# Patient Record
Sex: FEMALE | Race: WHITE | NOT HISPANIC OR LATINO | Employment: OTHER | URBAN - METROPOLITAN AREA
[De-identification: names, ages, dates, MRNs, and addresses within clinical notes are randomized per-mention and may not be internally consistent; named-entity substitution may affect disease eponyms.]

---

## 2017-04-25 PROBLEM — M85.80 OSTEOPENIA: Status: ACTIVE | Noted: 2017-04-25

## 2020-07-02 ENCOUNTER — HOSPITAL ENCOUNTER (OUTPATIENT)
Facility: HOSPITAL | Age: 85
Discharge: HOME | End: 2020-07-03
Attending: EMERGENCY MEDICINE
Payer: MEDICARE

## 2020-07-02 DIAGNOSIS — R41.3 MEMORY DEFICIT: ICD-10-CM

## 2020-07-02 DIAGNOSIS — E11.65 TYPE 2 DIABETES MELLITUS WITH HYPERGLYCEMIA, WITHOUT LONG-TERM CURRENT USE OF INSULIN: Chronic | ICD-10-CM

## 2020-07-02 DIAGNOSIS — F02.80 ALZHEIMER'S DEMENTIA WITHOUT BEHAVIORAL DISTURBANCE, UNSPECIFIED TIMING OF DEMENTIA ONSET: Primary | Chronic | ICD-10-CM

## 2020-07-02 DIAGNOSIS — G30.9 ALZHEIMER'S DEMENTIA WITHOUT BEHAVIORAL DISTURBANCE, UNSPECIFIED TIMING OF DEMENTIA ONSET: Primary | Chronic | ICD-10-CM

## 2020-07-02 PROBLEM — F03.90 DEMENTIA: Chronic | Status: ACTIVE | Noted: 2020-07-02

## 2020-07-02 PROBLEM — E03.9 HYPOTHYROIDISM: Chronic | Status: ACTIVE | Noted: 2020-07-02

## 2020-07-02 PROBLEM — I10 ESSENTIAL HYPERTENSION: Chronic | Status: ACTIVE | Noted: 2020-07-02

## 2020-07-02 LAB
ALBUMIN SERPL BCP-MCNC: 3.8 G/DL (ref 3.5–5.2)
ALP SERPL-CCNC: 90 U/L (ref 55–135)
ALT SERPL W/O P-5'-P-CCNC: 12 U/L (ref 10–44)
ANION GAP SERPL CALC-SCNC: 12 MMOL/L (ref 8–16)
AST SERPL-CCNC: 18 U/L (ref 10–40)
BACTERIA #/AREA URNS HPF: NORMAL /HPF
BASOPHILS # BLD AUTO: 0.03 K/UL (ref 0–0.2)
BASOPHILS NFR BLD: 0.4 % (ref 0–1.9)
BILIRUB SERPL-MCNC: 1.2 MG/DL (ref 0.1–1)
BILIRUB UR QL STRIP: NEGATIVE
BUN SERPL-MCNC: 11 MG/DL (ref 8–23)
CALCIUM SERPL-MCNC: 9.1 MG/DL (ref 8.7–10.5)
CHLORIDE SERPL-SCNC: 100 MMOL/L (ref 95–110)
CLARITY UR: ABNORMAL
CO2 SERPL-SCNC: 21 MMOL/L (ref 23–29)
COLOR UR: ABNORMAL
CREAT SERPL-MCNC: 1.1 MG/DL (ref 0.5–1.4)
DIFFERENTIAL METHOD: NORMAL
EOSINOPHIL # BLD AUTO: 0.1 K/UL (ref 0–0.5)
EOSINOPHIL NFR BLD: 1.7 % (ref 0–8)
ERYTHROCYTE [DISTWIDTH] IN BLOOD BY AUTOMATED COUNT: 12.3 % (ref 11.5–14.5)
EST. GFR  (AFRICAN AMERICAN): 53 ML/MIN/1.73 M^2
EST. GFR  (NON AFRICAN AMERICAN): 46 ML/MIN/1.73 M^2
GLUCOSE SERPL-MCNC: 373 MG/DL (ref 70–110)
GLUCOSE UR QL STRIP: ABNORMAL
HCT VFR BLD AUTO: 42.3 % (ref 37–48.5)
HGB BLD-MCNC: 14.3 G/DL (ref 12–16)
HGB UR QL STRIP: ABNORMAL
IMM GRANULOCYTES # BLD AUTO: 0.03 K/UL (ref 0–0.04)
IMM GRANULOCYTES NFR BLD AUTO: 0.4 % (ref 0–0.5)
KETONES UR QL STRIP: ABNORMAL
LEUKOCYTE ESTERASE UR QL STRIP: ABNORMAL
LIPASE SERPL-CCNC: 10 U/L (ref 4–60)
LYMPHOCYTES # BLD AUTO: 1.5 K/UL (ref 1–4.8)
LYMPHOCYTES NFR BLD: 21.8 % (ref 18–48)
MCH RBC QN AUTO: 30.7 PG (ref 27–31)
MCHC RBC AUTO-ENTMCNC: 33.8 G/DL (ref 32–36)
MCV RBC AUTO: 91 FL (ref 82–98)
MICROSCOPIC COMMENT: NORMAL
MONOCYTES # BLD AUTO: 0.4 K/UL (ref 0.3–1)
MONOCYTES NFR BLD: 6.1 % (ref 4–15)
NEUTROPHILS # BLD AUTO: 4.9 K/UL (ref 1.8–7.7)
NEUTROPHILS NFR BLD: 69.6 % (ref 38–73)
NITRITE UR QL STRIP: NEGATIVE
NRBC BLD-RTO: 0 /100 WBC
PH UR STRIP: 6 [PH] (ref 5–8)
PLATELET # BLD AUTO: 208 K/UL (ref 150–350)
PMV BLD AUTO: 11.1 FL (ref 9.2–12.9)
POCT GLUCOSE: 263 MG/DL (ref 70–110)
POCT GLUCOSE: 285 MG/DL (ref 70–110)
POTASSIUM SERPL-SCNC: 4 MMOL/L (ref 3.5–5.1)
PROT SERPL-MCNC: 6.9 G/DL (ref 6–8.4)
PROT UR QL STRIP: NEGATIVE
RBC # BLD AUTO: 4.66 M/UL (ref 4–5.4)
RBC #/AREA URNS HPF: 2 /HPF (ref 0–4)
SARS-COV-2 RDRP RESP QL NAA+PROBE: NEGATIVE
SODIUM SERPL-SCNC: 133 MMOL/L (ref 136–145)
SP GR UR STRIP: 1.01 (ref 1–1.03)
SQUAMOUS #/AREA URNS HPF: 5 /HPF
URN SPEC COLLECT METH UR: ABNORMAL
UROBILINOGEN UR STRIP-ACNC: NEGATIVE EU/DL
WBC # BLD AUTO: 7.01 K/UL (ref 3.9–12.7)
WBC #/AREA URNS HPF: 2 /HPF (ref 0–5)
YEAST URNS QL MICRO: NORMAL

## 2020-07-02 PROCEDURE — 82607 VITAMIN B-12: CPT

## 2020-07-02 PROCEDURE — 81000 URINALYSIS NONAUTO W/SCOPE: CPT

## 2020-07-02 PROCEDURE — 85025 COMPLETE CBC W/AUTO DIFF WBC: CPT

## 2020-07-02 PROCEDURE — 96361 HYDRATE IV INFUSION ADD-ON: CPT

## 2020-07-02 PROCEDURE — 82962 GLUCOSE BLOOD TEST: CPT

## 2020-07-02 PROCEDURE — 25000003 PHARM REV CODE 250: Performed by: EMERGENCY MEDICINE

## 2020-07-02 PROCEDURE — 96374 THER/PROPH/DIAG INJ IV PUSH: CPT

## 2020-07-02 PROCEDURE — 99285 EMERGENCY DEPT VISIT HI MDM: CPT | Mod: 25

## 2020-07-02 PROCEDURE — 63600175 PHARM REV CODE 636 W HCPCS: Performed by: HOSPITALIST

## 2020-07-02 PROCEDURE — G0378 HOSPITAL OBSERVATION PER HR: HCPCS

## 2020-07-02 PROCEDURE — 25000003 PHARM REV CODE 250: Performed by: HOSPITALIST

## 2020-07-02 PROCEDURE — 83690 ASSAY OF LIPASE: CPT

## 2020-07-02 PROCEDURE — S0028 INJECTION, FAMOTIDINE, 20 MG: HCPCS | Performed by: EMERGENCY MEDICINE

## 2020-07-02 PROCEDURE — 80053 COMPREHEN METABOLIC PANEL: CPT

## 2020-07-02 PROCEDURE — 96372 THER/PROPH/DIAG INJ SC/IM: CPT | Mod: 59

## 2020-07-02 PROCEDURE — U0002 COVID-19 LAB TEST NON-CDC: HCPCS

## 2020-07-02 RX ORDER — INSULIN ASPART 100 [IU]/ML
1-10 INJECTION, SOLUTION INTRAVENOUS; SUBCUTANEOUS
Status: DISCONTINUED | OUTPATIENT
Start: 2020-07-02 | End: 2020-07-03 | Stop reason: HOSPADM

## 2020-07-02 RX ORDER — LEVOTHYROXINE SODIUM 75 UG/1
75 TABLET ORAL
Status: DISCONTINUED | OUTPATIENT
Start: 2020-07-03 | End: 2020-07-03 | Stop reason: HOSPADM

## 2020-07-02 RX ORDER — GLUCAGON 1 MG
1 KIT INJECTION
Status: DISCONTINUED | OUTPATIENT
Start: 2020-07-02 | End: 2020-07-03 | Stop reason: HOSPADM

## 2020-07-02 RX ORDER — SODIUM CHLORIDE 0.9 % (FLUSH) 0.9 %
10 SYRINGE (ML) INJECTION
Status: DISCONTINUED | OUTPATIENT
Start: 2020-07-02 | End: 2020-07-03 | Stop reason: HOSPADM

## 2020-07-02 RX ORDER — FAMOTIDINE 10 MG/ML
20 INJECTION INTRAVENOUS
Status: COMPLETED | OUTPATIENT
Start: 2020-07-02 | End: 2020-07-02

## 2020-07-02 RX ORDER — IBUPROFEN 200 MG
24 TABLET ORAL
Status: DISCONTINUED | OUTPATIENT
Start: 2020-07-02 | End: 2020-07-03 | Stop reason: HOSPADM

## 2020-07-02 RX ORDER — ACETAMINOPHEN 325 MG/1
650 TABLET ORAL EVERY 6 HOURS PRN
Status: DISCONTINUED | OUTPATIENT
Start: 2020-07-02 | End: 2020-07-03 | Stop reason: HOSPADM

## 2020-07-02 RX ORDER — DONEPEZIL HYDROCHLORIDE 5 MG/1
5 TABLET, FILM COATED ORAL NIGHTLY
Status: DISCONTINUED | OUTPATIENT
Start: 2020-07-02 | End: 2020-07-03 | Stop reason: HOSPADM

## 2020-07-02 RX ORDER — AMLODIPINE BESYLATE 5 MG/1
5 TABLET ORAL DAILY
Status: DISCONTINUED | OUTPATIENT
Start: 2020-07-03 | End: 2020-07-03 | Stop reason: HOSPADM

## 2020-07-02 RX ORDER — MAG HYDROX/ALUMINUM HYD/SIMETH 200-200-20
60 SUSPENSION, ORAL (FINAL DOSE FORM) ORAL
Status: COMPLETED | OUTPATIENT
Start: 2020-07-02 | End: 2020-07-02

## 2020-07-02 RX ORDER — TALC
6 POWDER (GRAM) TOPICAL NIGHTLY PRN
Status: DISCONTINUED | OUTPATIENT
Start: 2020-07-02 | End: 2020-07-03 | Stop reason: HOSPADM

## 2020-07-02 RX ORDER — IBUPROFEN 200 MG
16 TABLET ORAL
Status: DISCONTINUED | OUTPATIENT
Start: 2020-07-02 | End: 2020-07-03 | Stop reason: HOSPADM

## 2020-07-02 RX ORDER — ENOXAPARIN SODIUM 100 MG/ML
40 INJECTION SUBCUTANEOUS EVERY 24 HOURS
Status: DISCONTINUED | OUTPATIENT
Start: 2020-07-02 | End: 2020-07-03 | Stop reason: HOSPADM

## 2020-07-02 RX ADMIN — ENOXAPARIN SODIUM 40 MG: 40 INJECTION SUBCUTANEOUS at 09:07

## 2020-07-02 RX ADMIN — SODIUM CHLORIDE 1000 ML: 0.9 INJECTION, SOLUTION INTRAVENOUS at 04:07

## 2020-07-02 RX ADMIN — DONEPEZIL HYDROCHLORIDE 5 MG: 5 TABLET, FILM COATED ORAL at 09:07

## 2020-07-02 RX ADMIN — FAMOTIDINE 20 MG: 10 INJECTION, SOLUTION INTRAVENOUS at 04:07

## 2020-07-02 RX ADMIN — ALUMINUM HYDROXIDE, MAGNESIUM HYDROXIDE, AND SIMETHICONE 60 ML: 200; 200; 20 SUSPENSION ORAL at 04:07

## 2020-07-02 RX ADMIN — Medication 6 MG: at 09:07

## 2020-07-02 NOTE — ED NOTES
"Past Medical History:   Diagnosis Date    Arthritis     Cataract     Diabetes mellitus     Hyperlipidemia     Hypertension     Skin cancer of nose     Thyroid condition      Pt presenting with report from  that pt is has been having confusing eg. " he is my friend" . She is aox3 unable to tell me what today is. Pt placed on cardiac monitor, cont PO, states that she is her for here for her nerves and all. Medications discussed. Pt denies nvfd, sob. When asked if she has chest pain pt states she feel shakey up there Mid upper chest   "

## 2020-07-02 NOTE — PROVIDER PROGRESS NOTES - EMERGENCY DEPT.
Emergency Department TeleTRIAGE Encounter Note      CHIEF COMPLAINT    Chief Complaint   Patient presents with    Memory Loss     Per patient and spouse pt has had some memory loss x 2 days. Denies any other symptoms. Spouse states pt could not remember who he was 2 days ago. pt Alert and appropriate at present. Denies dysuria, CP or SOB. Pt does report frontal HA x 2 days. VSS.        VITAL SIGNS   Initial Vitals [07/02/20 1307]   BP Pulse Resp Temp SpO2   (!) 151/96 88 18 98.2 °F (36.8 °C) 97 %      MAP       --            ALLERGIES    Review of patient's allergies indicates:   Allergen Reactions    Iodinated contrast media     Nexium [esomeprazole magnesium]     Other omega-3s      Cephlosporin, contrast dye      Penicillins     Plaquenil [hydroxychloroquine]     Prevacid [lansoprazole]     Prilosec [omeprazole magnesium]        PROVIDER TRIAGE NOTE  Sort note: Alice Medina, 85 y.o.  presented to the ED with c/o general malaise, shaking inside for the past few days.  Pt states that she took some of her husbands medications.  states this is not true.  Pt has had memory loss for the past 2 days.     Patient seen and medically screened by the Nurse Practitioner in Triage due to ED crowding.  Appropriate tests and/or medications ordered.  I performed a limited physical exam.  I am not assuming care of this patient at this time 1:11 PM.      ORDERS  Labs Reviewed   URINALYSIS, REFLEX TO URINE CULTURE   CBC W/ AUTO DIFFERENTIAL   COMPREHENSIVE METABOLIC PANEL       ED Orders (720h ago, onward)    Start Ordered     Status Ordering Provider    07/02/20 1318 07/02/20 1318  Urinalysis, Reflex to Urine Culture Urine, Clean Catch  STAT      Ordered TASHA SCHRADER    07/02/20 1318 07/02/20 1318  CBC auto differential  STAT  Collect    Ordered TASHA SCHRADER    07/02/20 1318 07/02/20 1318  Comprehensive metabolic panel  STAT  Collect    Ordered TASHA SCHRADER            Virtual Visit Note: The  provider triage portion of this emergency department evaluation and documentation was performed via [employer], a HIPAA-compliant telemedicine application, in concert with a tele-presenter in the room. A face to face patient evaluation with one of my colleagues will occur once the patient is placed in an emergency department room.      DISCLAIMER: This note was prepared with [employer] voice recognition transcription software. Garbled syntax, mangled pronouns, and other bizarre constructions may be attributed to that software system.

## 2020-07-02 NOTE — ED PROVIDER NOTES
Encounter Date: 7/2/2020       History     Chief Complaint   Patient presents with    Memory Loss     Per patient and spouse pt has had some memory loss x 2 days. Denies any other symptoms. Spouse states pt could not remember who he was 2 days ago. pt Alert and appropriate at present. Denies dysuria, CP or SOB. Pt does report frontal HA x 2 days. VSS.      HPI   This 85-year-old female presents to the emergency room, brought in by her  who reports that for 3 days the patient has had significant memory loss.  She does not recognize him.  She argues with him about directions to go home when she is driving in the car.  The patient denies painful urination there is no known history of fever.  The patient does not know the day of the week, or the month.  She complains of generally feeling bad .  She complains of epigastric abdominal pain present for 2 months off and on.  She denies other injuries or problems.  Review of patient's allergies indicates:   Allergen Reactions    Iodinated contrast media     Nexium [esomeprazole magnesium]     Other omega-3s      Cephlosporin, contrast dye      Penicillins     Plaquenil [hydroxychloroquine]     Prevacid [lansoprazole]     Prilosec [omeprazole magnesium]      Past Medical History:   Diagnosis Date    Arthritis     Cataract     Diabetes mellitus     Hyperlipidemia     Hypertension     Skin cancer of nose     Thyroid condition      Past Surgical History:   Procedure Laterality Date    SKIN CANCER EXCISION       Family History   Problem Relation Age of Onset    Thyroid disease Mother     Hypertension Sister     Diabetes Sister     Diabetes Brother     Thyroid disease Son     Amblyopia Neg Hx     Blindness Neg Hx     Cancer Neg Hx     Cataracts Neg Hx     Glaucoma Neg Hx     Macular degeneration Neg Hx     Retinal detachment Neg Hx     Strabismus Neg Hx     Stroke Neg Hx      Social History     Tobacco Use    Smoking status: Former Smoker    Substance Use Topics    Alcohol use: Yes    Drug use: No     Review of Systems  The patient was questioned specifically with regard to the following.  General: Fever, chills, sweats. Neuro: Headache. Eyes: eye problems. ENT: Ear pain, sore throat. Cardiovascular: Chest pain. Respiratory: Cough, shortness of breath. Gastrointestinal: Abdominal pain, vomiting, diarrhea. Genitourinary: Painful urination.  Musculoskeletal: Arm and leg problems. Skin: Rash.  The review of systems was negative except for the following:  Memory loss, feeling bad, epigastric abdominal pain.  Physical Exam     Initial Vitals [07/02/20 1307]   BP Pulse Resp Temp SpO2   (!) 151/96 88 18 98.2 °F (36.8 °C) 97 %      MAP       --         Physical Exam  The patient was examined specifically for the following:   General:No significant distress, Good color, Warm and dry. Head and neck:Scalp atraumatic, Neck supple. Neurological:Appropriate conversation, Gross motor deficits. Eyes:Conjugate gaze, Clear corneas. ENT: No epistaxis. Cardiac: Regular rate and rhythm, Grossly normal heart tones. Pulmonary: Wheezing, Rales. Gastrointestinal: Abdominal tenderness, Abdominal distention. Musculoskeletal: Extremity deformity, Apparent pain with range of motion of the joints. Skin: Rash.   The findings on examination were normal except for the following:  Cranial nerves, motor and sensory examination grossly unremarkable.  The patient has no recollection of the day of the week or the month.  She responds are normally in conversation.  ED Course   Procedures  Labs Reviewed   COMPREHENSIVE METABOLIC PANEL - Abnormal; Notable for the following components:       Result Value    Sodium 133 (*)     CO2 21 (*)     Glucose 373 (*)     Total Bilirubin 1.2 (*)     eGFR if  53 (*)     eGFR if non  46 (*)     All other components within normal limits   URINALYSIS, REFLEX TO URINE CULTURE - Abnormal; Notable for the following components:     Appearance, UA Hazy (*)     Glucose, UA 3+ (*)     Ketones, UA 1+ (*)     Occult Blood UA 1+ (*)     Leukocytes, UA 1+ (*)     All other components within normal limits    Narrative:     You may obtain urine by catheterization or clean catch.  Specimen Source->Urine   POCT GLUCOSE - Abnormal; Notable for the following components:    POCT Glucose 285 (*)     All other components within normal limits   POCT GLUCOSE - Abnormal; Notable for the following components:    POCT Glucose 263 (*)     All other components within normal limits   CBC W/ AUTO DIFFERENTIAL   LIPASE   SARS-COV-2 RNA AMPLIFICATION, QUAL   URINALYSIS MICROSCOPIC    Narrative:     You may obtain urine by catheterization or clean catch.  Specimen Source->Urine          Imaging Results          MRI Brain Without Contrast (Final result)  Result time 07/02/20 20:34:17    Final result by Hao Al MD (07/02/20 20:34:17)                 Impression:      No acute intracranial abnormality.    Senescent changes and chronic microvascular ischemic change.      Electronically signed by: Hao Al MD  Date:    07/02/2020  Time:    20:34             Narrative:    EXAMINATION:  MRI BRAIN WITHOUT CONTRAST    CLINICAL HISTORY:  Altered mental status;.    TECHNIQUE:  Multiplanar multisequence MR imaging of the brain was performed without contrast.    COMPARISON:  Head CT earlier same day    FINDINGS:  Intracranial compartment: Brain appears normally formed.  Frontal predominant involutional changes again noted.  The ventricles are midline and stable in size and configuration without distortion by mass effect or acute hydrocephalus.    No extra-axial blood or fluid collections.    Mild degree of patchy T2/FLAIR hyperintense signal foci of the periventricular and subcortical white matter, likely sequela of chronic small vessel ischemic change.  No mass lesion, acute hemorrhage, edema or acute infarct.    Normal vascular flow voids are preserved.  Partial empty  sella configuration, nonspecific.  Parasellar regions are within normal limits.    Skull/extracranial contents (limited evaluation): Bone marrow signal intensity is normal.  Suspected prior surgery to the left ocular lens.                               CT Head Without Contrast (Final result)  Result time 07/02/20 15:50:04    Final result by Sky Velasquez MD (07/02/20 15:50:04)                 Impression:      No acute abnormality.  Involutional changes with sequela of chronic microvascular ischemia.      Electronically signed by: Sky Velasquez MD  Date:    07/02/2020  Time:    15:50             Narrative:    EXAMINATION:  CT HEAD WITHOUT CONTRAST    CLINICAL HISTORY:  Altered mental status; Other amnesia    TECHNIQUE:  Low dose axial CT images obtained throughout the head without intravenous contrast. Sagittal and coronal reconstructions were performed.    COMPARISON:  None.    FINDINGS:  Intracranial compartment:    Involutional changes are noted.  No hydrocephalus.  No extra-axial blood or fluid collections.    Periventricular white matter hypoattenuation in keeping with chronic microvascular disease.  No parenchymal mass, hemorrhage, edema or major vascular distribution infarct.    Skull/extracranial contents (limited evaluation): No fracture. Mastoid air cells and paranasal sinuses are essentially clear.                                Medical decision making:  Given the above this patient presents to the emergency room with forgetful behavior over the course of the last 3 days only.  This is fairly sudden onset.  Dementia is considered but given the sudden onset delirium is considered.  Neurology was consulted.  Inpatient evaluation was recommended.  I will admit the patient for further evaluation and treatment CT of the head laboratory evaluation is essentially unremarkable in the emergency room.  The patient does have some hyperglycemia                                       Clinical Impression:        ICD-10-CM ICD-9-CM   1. Alzheimer's dementia without behavioral disturbance, unspecified timing of dementia onset  G30.9 331.0    F02.80 294.10   2. Memory deficit  R41.3 780.93   3. Type 2 diabetes mellitus with hyperglycemia, without long-term current use of insulin  E11.65 250.00     790.29             ED Disposition Condition    Observation                           Storm Denny MD  07/03/20 1927

## 2020-07-03 VITALS
BODY MASS INDEX: 26.75 KG/M2 | HEIGHT: 63 IN | HEART RATE: 73 BPM | OXYGEN SATURATION: 94 % | WEIGHT: 151 LBS | RESPIRATION RATE: 16 BRPM | DIASTOLIC BLOOD PRESSURE: 73 MMHG | TEMPERATURE: 98 F | SYSTOLIC BLOOD PRESSURE: 134 MMHG

## 2020-07-03 LAB
FOLATE SERPL-MCNC: 9 NG/ML (ref 4–24)
POCT GLUCOSE: 275 MG/DL (ref 70–110)
POCT GLUCOSE: 319 MG/DL (ref 70–110)
POCT GLUCOSE: 349 MG/DL (ref 70–110)
PROCALCITONIN SERPL IA-MCNC: 0.02 NG/ML
T4 FREE SERPL-MCNC: 1.07 NG/DL (ref 0.71–1.51)
TSH SERPL DL<=0.005 MIU/L-ACNC: 12.63 UIU/ML (ref 0.4–4)
VIT B12 SERPL-MCNC: 424 PG/ML (ref 210–950)

## 2020-07-03 PROCEDURE — 84439 ASSAY OF FREE THYROXINE: CPT

## 2020-07-03 PROCEDURE — 86592 SYPHILIS TEST NON-TREP QUAL: CPT

## 2020-07-03 PROCEDURE — 84145 PROCALCITONIN (PCT): CPT

## 2020-07-03 PROCEDURE — 82746 ASSAY OF FOLIC ACID SERUM: CPT

## 2020-07-03 PROCEDURE — G0378 HOSPITAL OBSERVATION PER HR: HCPCS | Mod: CS

## 2020-07-03 PROCEDURE — 96372 THER/PROPH/DIAG INJ SC/IM: CPT

## 2020-07-03 PROCEDURE — 25000003 PHARM REV CODE 250: Performed by: EMERGENCY MEDICINE

## 2020-07-03 PROCEDURE — 36415 COLL VENOUS BLD VENIPUNCTURE: CPT

## 2020-07-03 PROCEDURE — 97535 SELF CARE MNGMENT TRAINING: CPT

## 2020-07-03 PROCEDURE — 63600175 PHARM REV CODE 636 W HCPCS: Performed by: EMERGENCY MEDICINE

## 2020-07-03 PROCEDURE — 84443 ASSAY THYROID STIM HORMONE: CPT

## 2020-07-03 PROCEDURE — 97165 OT EVAL LOW COMPLEX 30 MIN: CPT

## 2020-07-03 RX ORDER — DONEPEZIL HYDROCHLORIDE 5 MG/1
5 TABLET, FILM COATED ORAL NIGHTLY
Qty: 30 TABLET | Refills: 1 | Status: SHIPPED | OUTPATIENT
Start: 2020-07-03 | End: 2021-01-26 | Stop reason: SDUPTHER

## 2020-07-03 RX ORDER — LEVOTHYROXINE SODIUM 75 UG/1
100 TABLET ORAL
Qty: 45 TABLET | Refills: 1 | Status: ON HOLD | OUTPATIENT
Start: 2020-07-04 | End: 2021-02-27 | Stop reason: HOSPADM

## 2020-07-03 RX ADMIN — LEVOTHYROXINE SODIUM 75 MCG: 75 TABLET ORAL at 06:07

## 2020-07-03 RX ADMIN — INSULIN ASPART 3 UNITS: 100 INJECTION, SOLUTION INTRAVENOUS; SUBCUTANEOUS at 12:07

## 2020-07-03 RX ADMIN — AMLODIPINE BESYLATE 5 MG: 5 TABLET ORAL at 08:07

## 2020-07-03 NOTE — ASSESSMENT & PLAN NOTE
-Mrs. Medina was placed in observation status  -She noted diminished memory recently  -CT head and MRI obtained and no evidence of stroke  -Vitals essentially normal.  -Electrolytes and renal function are ok  -TSH elevated significantly.  Will increase levothyroxine to 100mcg daily.  -Will check folate, b12 and rpr in search for reversible encephalopathies.  -Clinically appears to be doing very well.  -Will send new Rx of donepizil as it is not on home med list  -Will place ambulatory referral to neurology and pt/ot for completion of OT-SHAMIKA: Occupational Therapy  Off-road Assessment Battery d/t pt still driving and may be unsafe.  -Discussed with patient and  and both understand.  -Follow up with pcp within 1 week.

## 2020-07-03 NOTE — PLAN OF CARE
Problem: Occupational Therapy Goal  Goal: Occupational Therapy Goal  Outcome: Adequate for Care Transition     Initial OT eval completed. Pt is functioning at baseline: supervision for ADLs/in-room functional mobility/transfers for safety. OT rec OP OT with completion of OT-SHAMIKA: Occupational Therapy  Off-road Assessment Battery d/t pt still driving and unsafe- forgetful with common ADLs/two step commands. No DME needs.

## 2020-07-03 NOTE — HPI
85 y.o. female with hypertension, hyperlipidemia, hypothyroidism, osteopenia, dm 2, and dementia (diagnosed November 2018) presents with her spouse complaint of worsening memory loss.  Denies fever, chills, cough, SOB, chest pain, palpitations, dizziness, syncope, headache, vision changes, weakness, facial asymmetry, nausea, vomiting, diarrhea, abdominal pain, bloody or black stools, or dysuria.  Per chart review she was diagnosed with dementia in 2018 and started on Aricept which she subsequently stopped taking because she felt like it was not working.  Was seen in the clinic on 06/30/2020 by Laura Hyde NP and given a refill for her Aricept and a referral to Neurology.  In the ED, CBC, CMP, urinalysis, and CT head were unremarkable for any acute abnormality.  COVID negative.  Placed in observation.

## 2020-07-03 NOTE — ASSESSMENT & PLAN NOTE
Check A1c  Hold oral antihyperglycemics while inpatient  PRN sliding scale insulin  ACHS glucose monitoring   ADA diet

## 2020-07-03 NOTE — PLAN OF CARE
Problem: Adult Inpatient Plan of Care  Goal: Plan of Care Review  Outcome: Ongoing, Progressing     Problem: Fall Injury Risk  Goal: Absence of Fall and Fall-Related Injury  Outcome: Ongoing, Progressing     Problem: Diabetes Comorbidity  Goal: Blood Glucose Level Within Desired Range  Outcome: Ongoing, Progressing     Problem: Cognitive Impairment (Dementia Signs/Symptoms)  Goal: Optimized Cognitive Function (Dementia Signs/Symptoms)  Outcome: Ongoing, Progressing     Problem: Psychologic Impairment (Dementia Signs/Symptoms)  Goal: Improved Psychological Symptoms (Dementia Signs/Symptoms)  Outcome: Ongoing, Progressing     Problem: Social/Functional Impairment (Dementia Signs/Symptoms)  Goal: Improved Social/Functional Skills or Ability (Dementia Signs/Symptoms)  Outcome: Ongoing, Progressing

## 2020-07-03 NOTE — NURSING
Patient discharged to home with .  and patiens given discharge instructions and medication record.Educated on all instructions. SL removed tele removed and returned. Patient off unit in  with belongings. NO distress no complaints.

## 2020-07-03 NOTE — SUBJECTIVE & OBJECTIVE
Past Medical History:   Diagnosis Date    Arthritis     Cataract     Diabetes mellitus     Hyperlipidemia     Hypertension     Skin cancer of nose     Thyroid condition        Past Surgical History:   Procedure Laterality Date    SKIN CANCER EXCISION         Review of patient's allergies indicates:   Allergen Reactions    Iodinated contrast media     Nexium [esomeprazole magnesium]     Other omega-3s      Cephlosporin, contrast dye      Penicillins     Plaquenil [hydroxychloroquine]     Prevacid [lansoprazole]     Prilosec [omeprazole magnesium]        No current facility-administered medications on file prior to encounter.      Current Outpatient Medications on File Prior to Encounter   Medication Sig    amlodipine (NORVASC) 5 MG tablet     levothyroxine (SYNTHROID) 75 MCG tablet Take 75 mcg by mouth once daily.      ascorbic acid (VITAMIN C) 100 MG tablet Take 100 mg by mouth once daily.      ergocalciferol (ERGOCALCIFEROL) 50,000 unit Cap Take 50,000 Units by mouth every 7 days.    LINZESS 145 mcg Cap      Family History     Problem Relation (Age of Onset)    Diabetes Sister, Brother    Hypertension Sister    Thyroid disease Mother, Son        Tobacco Use    Smoking status: Former Smoker   Substance and Sexual Activity    Alcohol use: Yes    Drug use: No    Sexual activity: Never     Review of Systems   Constitutional: Negative for chills, fatigue and fever.   Eyes: Negative for photophobia and visual disturbance.   Respiratory: Negative for cough and shortness of breath.    Cardiovascular: Negative for chest pain, palpitations and leg swelling.   Gastrointestinal: Negative for abdominal pain, diarrhea, nausea and vomiting.   Genitourinary: Negative for dysuria, frequency and urgency.   Skin: Negative for pallor, rash and wound.   Neurological: Negative for light-headedness and headaches.   Psychiatric/Behavioral: Positive for confusion (Worsening chronic memory loss). Negative for  decreased concentration.     Objective:     Vital Signs (Most Recent):  Temp: 98.8 °F (37.1 °C) (07/02/20 1745)  Pulse: 65 (07/02/20 1745)  Resp: 15 (07/02/20 1745)  BP: (!) 153/71 (07/02/20 1745)  SpO2: 95 % (07/02/20 1745) Vital Signs (24h Range):  Temp:  [98.2 °F (36.8 °C)-98.8 °F (37.1 °C)] 98.8 °F (37.1 °C)  Pulse:  [54-88] 65  Resp:  [15-36] 15  SpO2:  [94 %-97 %] 95 %  BP: (120-153)/(66-96) 153/71     Weight: 68 kg (150 lb)  Body mass index is 26.57 kg/m².    Physical Exam  Vitals signs and nursing note reviewed.   Constitutional:       General: She is not in acute distress.     Appearance: She is well-developed.   HENT:      Head: Normocephalic and atraumatic.      Right Ear: External ear normal.      Left Ear: External ear normal.      Nose: Nose normal.   Eyes:      Conjunctiva/sclera: Conjunctivae normal.      Pupils: Pupils are equal, round, and reactive to light.   Neck:      Musculoskeletal: Normal range of motion and neck supple.   Cardiovascular:      Rate and Rhythm: Normal rate and regular rhythm.   Pulmonary:      Effort: Pulmonary effort is normal. No respiratory distress.      Breath sounds: Normal breath sounds. No wheezing.   Abdominal:      General: Bowel sounds are normal. There is no distension.      Palpations: Abdomen is soft.      Tenderness: There is no abdominal tenderness.      Comments: No palpable hepatomegaly or splenomegaly    Musculoskeletal: Normal range of motion.         General: No tenderness.   Skin:     General: Skin is warm and dry.   Neurological:      Mental Status: She is alert and oriented to person, place, and time.   Psychiatric:         Thought Content: Thought content normal.         Cognition and Memory: Memory is impaired.           CRANIAL NERVES     CN III, IV, VI   Pupils are equal, round, and reactive to light.       Significant Labs: All pertinent labs within the past 24 hours have been reviewed.    Significant Imaging: I have reviewed all pertinent imaging  results/findings within the past 24 hours.

## 2020-07-03 NOTE — NURSING
Report rec'd from Asmita in ED, no c/o pain from patient. Patient NAD at this time. New dx: dementia. Ambulatory and continent. Currently in MRI.

## 2020-07-03 NOTE — PROGRESS NOTES
WRITTEN HEALTHCARE DISCHARGE INFORMATION      Things that YOU are responsible for to Manage Your Care At Home:  1. Getting your prescriptions filled.  2. Taking you medications as directed. DO NOT MISS ANY DOSES!  3. Going to your follow-up doctor appointments. This is important because it allows the doctor to monitor your progress and to determine if any changes need to be made to your treatment plan.     If you are unable to make your follow up appointments, please call the number listed and reschedule this appointment.      After discharge, if you need assistance, you can call [facility] On Call Nurse Care Line for 24/7 assistance at [phone]     If you are experience any signs or symptoms, Call your doctor or Call 911 and come to your nearest Emergency Room.     [facility].        You should receive a call from [facility] within 48-72 hours to help manage your care after discharge. Please try to make sure that you answer your phone for this important phone call.     Follow-up Information     Yesi Perrin MD In 1 week.    Specialty: Internal Medicine  Why: Please call office on Monday to arrange Hospital discharge follow up appointment in 1 week  Contact information:  [address] [place] [id number] [phone]             [facility].    Why: referral made for outpatient Occupational Therapy; kelley call office on Monday July 6th at noon if you have not heard from anyone by then  Contact information:  [address] [facility]  [place] [id number] [phone]

## 2020-07-03 NOTE — DISCHARGE SUMMARY
[facility]  Discharge Summary      Patient Name: Alice Medina  MRN: [id number]  Admission Date: 7/2/2020  Hospital Length of Stay: 0 days  Discharge Date and Time: No discharge date for patient encounter.  Attending Physician: Gurwinder Villagomez MD   Discharging Provider: Gurwinder Villagomez MD  Primary Care Provider: Yesi Perrin MD      HPI:   85 y.o. female with hypertension, hyperlipidemia, hypothyroidism, osteopenia, dm 2, and dementia (diagnosed November 2018) presents with her spouse complaint of worsening memory loss.  Denies fever, chills, cough, SOB, chest pain, palpitations, dizziness, syncope, headache, vision changes, weakness, facial asymmetry, nausea, vomiting, diarrhea, abdominal pain, bloody or black stools, or dysuria.  Per chart review she was diagnosed with dementia in 2018 and started on Aricept which she subsequently stopped taking because she felt like it was not working.  Was seen in the clinic on 06/30/2020 by Laura Hyde NP and given a refill for her Aricept and a referral to Neurology.  In the ED, CBC, CMP, urinalysis, and CT head were unremarkable for any acute abnormality.  COVID negative.  Placed in observation.    Consults:   Consults (From admission, onward)        Status Ordering Provider     Inpatient consult to Neurology  Once     Provider:  Uziel Roman MD    Acknowledged GURWINDER CUNNINGHAM        Hospital Course By Problem:   * Dementia  -Mrs. Medina was placed in observation status  -She noted diminished memory recently  -CT head and MRI obtained and no evidence of stroke  -Vitals essentially normal.  -Electrolytes and renal function are ok  -TSH elevated significantly.  Will increase levothyroxine to 100mcg daily.  -Will check folate, b12 and rpr in search for reversible encephalopathies.  -Clinically appears to be doing very well.  -Will send new Rx of donepizil as it is not on home med list  -Will place ambulatory referral to neurology  and pt/ot for completion of OT-SHAMIKA: Occupational Therapy  Off-road Assessment Battery d/t pt still driving and may be unsafe.  -Discussed with patient and  and both understand.  -Follow up with pcp within 1 week.    Type 2 diabetes mellitus with hyperglycemia, without long-term current use of insulin  -Patient states she is diet controlled at home and not on medical treatment for this  -During her stay her sugars are elevated in the 250-300 range.  -Discussed with patient and  and advocated starting oral medications, but they prefer to discuss with their primary care provider at this time  -Continue diet control.  Likely to need medical treatment, but defer to primary care provider.  -Place ambulatory referral to endocrinology.  -Follow up with pcp within one week.    Hypothyroidism  -TSH rather elevated  -Will increase dose to 100mcg  -Repeat TSH in 4- 6 weeks.    Essential hypertension  -Well controlled, continue home [facility]     Final Active Diagnoses:    Diagnosis Date Noted POA    PRINCIPAL PROBLEM:  Dementia [F03.90] 07/02/2020 Yes     Chronic    Essential hypertension [I10] 07/02/2020 Yes     Chronic    Hypothyroidism [E03.9] 07/02/2020 Yes     Chronic    Type 2 diabetes mellitus with hyperglycemia, without long-term current use of insulin [E11.65]  Yes     Chronic      Problems Resolved During this Admission:       Discharged Condition: stable    Disposition: Home or Self Care    Follow Up:  Follow-up Information     Yesi Perrin MD In 1 week.    Specialty: Internal Medicine  Contact information:  [place] [address] [phone]                 Patient Instructions:      Ambulatory referral/consult to Physical/Occupational Therapy   Standing Status: Future   Referral Priority: Routine Referral Type: Physical Medicine   Referral Reason: Specialty Services Required   Number of Visits Requested: 1     Ambulatory referral/consult to Neurology   Standing Status: Future    Referral Priority: Routine Referral Type: Consultation   Referral Reason: Specialty Services Required   Requested Specialty: Neurology   Number of Visits Requested: 1     Diet Cardiac     Notify your health care provider if you experience any of the following:  increased confusion or weakness     Notify your health care provider if you experience any of the following:  persistent dizziness, light-headedness, or visual disturbances     Notify your health care provider if you experience any of the following:  worsening rash     Notify your health care provider if you experience any of the following:  severe persistent headache     Notify your health care provider if you experience any of the following:  difficulty breathing or increased cough     Notify your health care provider if you experience any of the following:  severe uncontrolled pain     Notify your health care provider if you experience any of the following:  persistent nausea and vomiting or diarrhea     Notify your health care provider if you experience any of the following:  temperature >100.4     Activity as tolerated       Significant Diagnostic Studies: Labs:   BMP:   Recent Labs   Lab 07/02/20  1412   *   *   K 4.0      CO2 21*   BUN 11   CREATININE 1.1   CALCIUM 9.1   , CMP   Recent Labs   Lab 07/02/20  1412   *   K 4.0      CO2 21*   *   BUN 11   CREATININE 1.1   CALCIUM 9.1   PROT 6.9   ALBUMIN 3.8   BILITOT 1.2*   ALKPHOS 90   AST 18   ALT 12   ANIONGAP 12   ESTGFRAFRICA 53*   EGFRNONAA 46*   , CBC   Recent Labs   Lab 07/02/20  1412   WBC 7.01   HGB 14.3   HCT 42.3       and A1C: No results for input(s): HGBA1C in the last 4320 hours.    Pending Diagnostic Studies:     Procedure Component Value Units Date/Time    Folate [[id number]] Collected: 07/03/20 0900    Order Status: Sent Lab Status: In process Updated: 07/03/20 0900    Specimen: Blood     RPR [[id number]] Collected: 07/03/20 0900    Order  Status: Sent Lab Status: In process Updated: 07/03/20 0900    Specimen: Blood     Vitamin B12 [[id number]] Collected: 07/02/20 1412    Order Status: Sent Lab Status: In process Updated: 07/02/20 1605    Specimen: Blood          Medications:  Reconciled Home Medications:      Medication List      START taking these medications    donepeziL 5 MG tablet  Commonly known as: ARICEPT  Take 1 tablet (5 mg total) by mouth every evening.        CHANGE how you take these medications    levothyroxine 75 MCG tablet  Commonly known as: SYNTHROID  Take 1.5 tablets (112.5 mcg total) by mouth before breakfast.  Start taking on: July 4, 2020  What changed:   · how much to take  · when to take this        CONTINUE taking these medications    amLODIPine 5 MG tablet  Commonly known as: NORVASC     ergocalciferol 50,000 unit Cap  Commonly known as: ERGOCALCIFEROL  Take 50,000 Units by mouth every 7 days.     VITAMIN C 100 MG tablet  Generic drug: ascorbic acid (vitamin C)  Take 100 mg by mouth once daily.        ASK your doctor about these medications    LINZESS 145 mcg Cap capsule  Generic drug: linaCLOtide            Indwelling Lines/Drains at time of discharge:   Lines/Drains/Airways     None                 Time spent on the discharge of patient: 35 minutes  Patient was seen and examined on the date of discharge and determined to be suitable for discharge.         Storm Villagomez MD  Department of Hospital Medicine  [facility]

## 2020-07-03 NOTE — DISCHARGE INSTRUCTIONS
WRITTEN HEALTHCARE DISCHARGE INFORMATION      Things that YOU are responsible for to Manage Your Care At Home:  1. Getting your prescriptions filled.  2. Taking you medications as directed. DO NOT MISS ANY DOSES!  3. Going to your follow-up doctor appointments. This is important because it allows the doctor to monitor your progress and to determine if any changes need to be made to your treatment plan.     If you are unable to make your follow up appointments, please call the number listed and reschedule this appointment.      After discharge, if you need assistance, you can call [facility] for 24/7 assistance at [phone]     If you are experience any signs or symptoms, Call your doctor or Call 911 and come to your nearest Emergency Room.     [facility] and allowing us to care for you.        You should receive a call from [facility] within 48-72 hours to help manage your care after discharge. Please try to make sure that you answer your phone for this important phone call.       -Your dose of levothyroxine appears to be too low.  I have sent prescription for a higher dose.  Please follow up with pcp within 1 week for ongoing monitoring.  -I have also placed referral to outpatient neurology for memory evaluation and outpatient physical/occupational therapy for evaluation and treatment as well as formal driving safety evaluation.  -Your blood sugar is rather elevated and you are only on dietary control of diabetes at this time.  You will likely need medical treatment.  I have placed a referral for you to see an endocrinologist to discuss this and consider options.

## 2020-07-03 NOTE — PLAN OF CARE
Discharge planning and home needs addressed with PT; patient confirmed information on face sheet as correct;     TN Role Explained.  Patient identified by using 2 identifiers:  Name and date of birth    Patient stated that she lives independently at home with spouse; 0 DME and denies needs     Patient stated that spouse, Earnest  WILL HELP AT HOME WITH her RECOVERY if needed.       TN name and means of contact given to patient and encouraged to call for any discharge needs or questions        [facility] - [place], [place] - [address]  [address]  [address]  [place] [address]  Phone: [phone] Fax: [phone]     07/03/20 1209   Discharge Assessment   Assessment Type Discharge Planning Assessment   Confirmed/corrected address and phone number on facesheet? Yes   Assessment information obtained from? Patient   Expected Length of Stay (days) 2   Communicated expected length of stay with patient/caregiver yes   Prior to hospitilization cognitive status: Alert/Oriented   Prior to hospitalization functional status: Independent   Current cognitive status: Alert/Oriented   Current Functional Status: Independent   Lives With spouse   Able to Return to Prior Arrangements yes   Is patient able to care for self after discharge? Yes   Who are your caregiver(s) and their phone number(s)? Earnest [place] (spouse) [phone]   Patient's perception of discharge disposition home or selfcare   Readmission Within the Last 30 Days no previous admission in last 30 days   Patient currently being followed by outpatient case management? No   Patient currently receives any other outside agency services? No   Equipment Currently Used at Home none   Part D Coverage [employer]   Do you have any problems affording any of your prescribed medications? No   Is the patient taking medications as prescribed? yes   Does the patient have transportation home? Yes   Transportation Anticipated family or friend will provide   Does the  patient receive services at the Coumadin Clinic? No   Discharge Plan A Home   DME Needed Upon Discharge    (TBD)   Patient/Family in Agreement with Plan yes

## 2020-07-03 NOTE — PLAN OF CARE
EDUCATION:  Patient discharge instructions updated to include educational information on Dementia that will be printed on patient's AVS to review upon discharge; Information reviewed  with patient and spouse at bedside and include  signs and symptoms to look for and call the doctor if experiencing, and symptoms that may indicate a medical emergency: CALL 911.    All questions answered.  Teach back method used. Spouse repeated that he will call 911 for sudden changes in behavior and increased foretfulness      Reviewed with spouse things that he can assist pt with to better manage her care at home to include taking all medications as precscribed and make sure patient attend all follow up visits;     F/U appointments with PCP and outpt therapy were reviewed;  verbalized understanding     NurseRoro notified that all discharge planning needs have been addressed and patient can be discharged from  standpoint         07/03/20 1330   Final Note   Assessment Type Final Discharge Note   Anticipated Discharge Disposition Home   What phone number can be called within the next 1-3 days to see how you are doing after discharge? [phone]   Hospital Follow Up  Appt(s) scheduled? Yes   Discharge plans and expectations educations in teach back method with documentation complete? Yes   Right Care Referral Info   Post Acute Recommendation No Care   Post-Acute Status   Post-Acute Authorization Other   Other Status See Comments  (Ambulatory referral to outpatient therapy)   Discharge Delays None known at this time

## 2020-07-03 NOTE — PT/OT/SLP EVAL
Occupational Therapy   Evaluation and Discharge Note    Name: Alice Mednia  MRN: [id number]  Admitting Diagnosis:  Dementia      Recommendations:     Discharge Recommendations: outpatient OT(with 24 hour supervision)  Discharge Equipment Recommendations:  none  Barriers to discharge:  None    Assessment:     Alice Medina is a 85 y.o. female with a medical diagnosis of Dementia. Pt is functioning at baseline: supervision for ADLs/in-room functional mobility/transfers for safety. OT rec OP OT with completion of OT-SHAMIKA: Occupational Therapy  Off-road Assessment Battery d/t pt still driving and unsafe- forgetful with common ADLs/two step commands. No DME needs.     Plan:     During this hospitalization, patient does not require further acute OT services.  Please re-consult if situation changes. Pt will require supervision with all OOB for safety.  · Plan of Care Reviewed with: patient, spouse    Subjective     Chief Complaint: needing to use the restroom; forgetful   Patient/Family Comments/goals:  is happy she stays active; pt forgot she told me a few minutes prior that she volunteers [facility] and told me the story again      Occupational Profile:  Living Environment: Pt lives with her  in a 2 story home with no steps at entry. Pt's bed/bath are on the second floor: full flight of stairs with B/L HR. Bathroom set-up: both walk-in shower and tub/shower combo available. Bathroom available on the first floor.   Previous level of function: Pt was independent PTA. 0 falls reported in the last 3 months.   Roles and Routines: drives, volunteers a couple times a week at the [facility]   Equipment Used at home:  none  Assistance upon Discharge:  (3 sons and 1 daughter all live out of state)     Pain/Comfort:  · Pain Rating 1: 0/10    Patients cultural, spiritual, Adventism conflicts given the current situation: no    Objective:     Communicated with: nurseRoro, prior to session.  Patient  Patient will not have HD on Monday 7/3/17, will have HD tx on Tuesday 7/4/17 Dr Brisa Brandt notified. "found HOB elevated with peripheral IV upon OT entry to room.    General Precautions: Standard, fall, diabetic   Orthopedic Precautions:N/A   Braces: N/A     Occupational Performance:    Bed Mobility:    · Patient completed Scooting/Bridging with supervision  · Patient completed Supine to Sit with supervision    Functional Mobility/Transfers:  · Patient completed Sit <> Stand Transfer with supervision  with  no assistive device   · Patient completed Bed <> Chair Transfer using Step Transfer technique with supervision with no assistive device  · Patient completed Toilet Transfer Step Transfer technique with supervision with  no AD  · Functional Mobility: Pt ambulated within the room and bathroom with SUP and no DME. When OT cued pt to "walk to the sink and brush her teeth," pt was able to walk to the sink, but forgot what to do next. When pt walked out of the bathroom and cued to "sit in the recliner chair," pt scanned the environment and required tactile cueing in order to find the chair.     Activities of Daily Living:  · Grooming: supervision standing at the sink to brush her teeth, wash her face, brush her hair, and wash her hands  · Upper Body Dressing: set-up with minimal verbal cueing to don back gown    · Lower Body Dressing: supervision seated EOB to don/doff L sock  · Toileting: supervision on commode for toileting; set-up with toilet paper    Cognitive/Visual Perceptual:  Cognitive/Psychosocial Skills:     -       Oriented to: name, 's name, , place (when given 4 prompts- but thought she was at [facility] ), year (when given 2 prompts of  vs )   -       Follows Commands/attention:Follows some one/two step commands  -       Communication: clear/fluent  -       Memory: Impaired STM and Poor immediate recall  -       Safety awareness/insight to disability: impaired   -       Mood/Affect/Coping skills/emotional control: Pleasant  Visual/Perceptual:      -Intact  R/L discrimination      Physical " Exam:  Balance:    -       seated: MOD I; standing: SUP  Postural examination/scapula alignment:    -       Rounded shoulders  Skin integrity: Visible skin intact  Edema:  None noted  Sensation:    -       Intact  light/touch BUE  Dominant hand:    -       Right  Upper Extremity Range of Motion:     -       Right Upper Extremity: WFL  -       Left Upper Extremity: WFL  Upper Extremity Strength:    -       Right Upper Extremity: WFL  -       Left Upper Extremity: WFL   Strength:    -       Right Upper Extremity: WFL  -       Left Upper Extremity: WFL  Fine Motor Coordination:    -       Intact  Left hand, manipulation of objects and Right hand, manipulation of objects  Gross motor coordination:   WFL    AMPAC 6 Click ADL:  AMPAC Total Score: 24    Treatment & Education:  · Pt and spouse educated on OT role/POC.   · Importance of OOB activity with staff assistance.  · Safety during functional t/f and mobility   · White board updated   · Multiple self-care tasks/functional mobility completed- assistance level noted above   · All questions/concerns answered within OT scope of practice     Education:    Patient left up in chair with all lines intact, call button in reach, chair alarm on, nurse, Roro, notified,  present and all needs within reach/lap tray in front of patient; door left open (across from the nurses' station). OT requested that if the  steps out, to let a nurse know prior to leaving if she is still up in the chair- he agreed.     GOALS:   Multidisciplinary Problems     Occupational Therapy Goals        Problem: Occupational Therapy Goal    Goal Priority Disciplines Outcome Interventions   Occupational Therapy Goal     OT, PT/OT Adequate for Care Transition                    History:     Past Medical History:   Diagnosis Date    Arthritis     Cataract     Diabetes mellitus     Hyperlipidemia     Hypertension     Skin cancer of nose     Thyroid condition        Past Surgical  History:   Procedure Laterality Date    SKIN CANCER EXCISION         Time Tracking:     OT Date of Treatment: 07/03/20  OT Start Time: 1001  OT Stop Time: 1029  OT Total Time (min): 28 min    Billable Minutes:Evaluation 15 min  Self Care/Home Management 13 min  Total Time 28 min     Ligia Lindsay OT  7/3/2020

## 2020-07-03 NOTE — H&P
[facility] Medicine  History & Physical    Patient Name: Alice Medina  MRN: [id number]  Admission Date: 7/2/2020  Attending Physician: Storm Denny MD   Primary Care Provider: Yesi Perrin MD         Patient information was obtained from patient, past medical records and ER records.     Subjective:     Principal Problem:Dementia    Chief Complaint:   Chief Complaint   Patient presents with    Memory Loss     Per patient and spouse pt has had some memory loss x 2 days. Denies any other symptoms. Spouse states pt could not remember who he was 2 days ago. pt Alert and appropriate at present. Denies dysuria, CP or SOB. Pt does report frontal HA x 2 days. VSS.         HPI: 85 y.o. female with hypertension, hyperlipidemia, hypothyroidism, osteopenia, dm 2, and dementia (diagnosed November 2018) presents with her spouse complaint of worsening memory loss.  Denies fever, chills, cough, SOB, chest pain, palpitations, dizziness, syncope, headache, vision changes, weakness, facial asymmetry, nausea, vomiting, diarrhea, abdominal pain, bloody or black stools, or dysuria.  Per chart review she was diagnosed with dementia in 2018 and started on Aricept which she subsequently stopped taking because she felt like it was not working.  Was seen in the clinic on 06/30/2020 by Laura Hyde NP and given a refill for her Aricept and a referral to Neurology.  In the ED, CBC, CMP, urinalysis, and CT head were unremarkable for any acute abnormality.  COVID negative.  Placed in observation.    Past Medical History:   Diagnosis Date    Arthritis     Cataract     Diabetes mellitus     Hyperlipidemia     Hypertension     Skin cancer of nose     Thyroid condition        Past Surgical History:   Procedure Laterality Date    SKIN CANCER EXCISION         Review of patient's allergies indicates:   Allergen Reactions    Iodinated contrast media     Nexium [esomeprazole magnesium]     Other omega-3s       Cephlosporin, contrast dye      Penicillins     Plaquenil [hydroxychloroquine]     Prevacid [lansoprazole]     Prilosec [omeprazole magnesium]        No current facility-administered medications on file prior to encounter.      Current Outpatient Medications on File Prior to Encounter   Medication Sig    amlodipine (NORVASC) 5 MG tablet     levothyroxine (SYNTHROID) 75 MCG tablet Take 75 mcg by mouth once daily.      ascorbic acid (VITAMIN C) 100 MG tablet Take 100 mg by mouth once daily.      ergocalciferol (ERGOCALCIFEROL) 50,000 unit Cap Take 50,000 Units by mouth every 7 days.    LINZESS 145 mcg Cap      Family History     Problem Relation (Age of Onset)    Diabetes Sister, Brother    Hypertension Sister    Thyroid disease Mother, Son        Tobacco Use    Smoking status: Former Smoker   Substance and Sexual Activity    Alcohol use: Yes    Drug use: No    Sexual activity: Never     Review of Systems   Constitutional: Negative for chills, fatigue and fever.   Eyes: Negative for photophobia and visual disturbance.   Respiratory: Negative for cough and shortness of breath.    Cardiovascular: Negative for chest pain, palpitations and leg swelling.   Gastrointestinal: Negative for abdominal pain, diarrhea, nausea and vomiting.   Genitourinary: Negative for dysuria, frequency and urgency.   Skin: Negative for pallor, rash and wound.   Neurological: Negative for light-headedness and headaches.   Psychiatric/Behavioral: Positive for confusion (Worsening chronic memory loss). Negative for decreased concentration.     Objective:     Vital Signs (Most Recent):  Temp: 98.8 °F (37.1 °C) (07/02/20 1745)  Pulse: 65 (07/02/20 1745)  Resp: 15 (07/02/20 1745)  BP: (!) 153/71 (07/02/20 1745)  SpO2: 95 % (07/02/20 1745) Vital Signs (24h Range):  Temp:  [98.2 °F (36.8 °C)-98.8 °F (37.1 °C)] 98.8 °F (37.1 °C)  Pulse:  [54-88] 65  Resp:  [15-36] 15  SpO2:  [94 %-97 %] 95 %  BP: (120-153)/(66-96) 153/71     Weight: 68  kg (150 lb)  Body mass index is 26.57 kg/m².    Physical Exam  Vitals signs and nursing note reviewed.   Constitutional:       General: She is not in acute distress.     Appearance: She is well-developed.   HENT:      Head: Normocephalic and atraumatic.      Right Ear: External ear normal.      Left Ear: External ear normal.      Nose: Nose normal.   Eyes:      Conjunctiva/sclera: Conjunctivae normal.      Pupils: Pupils are equal, round, and reactive to light.   Neck:      Musculoskeletal: Normal range of motion and neck supple.   Cardiovascular:      Rate and Rhythm: Normal rate and regular rhythm.   Pulmonary:      Effort: Pulmonary effort is normal. No respiratory distress.      Breath sounds: Normal breath sounds. No wheezing.   Abdominal:      General: Bowel sounds are normal. There is no distension.      Palpations: Abdomen is soft.      Tenderness: There is no abdominal tenderness.      Comments: No palpable hepatomegaly or splenomegaly    Musculoskeletal: Normal range of motion.         General: No tenderness.   Skin:     General: Skin is warm and dry.   Neurological:      Mental Status: She is alert and oriented to person, place, and time.   Psychiatric:         Thought Content: Thought content normal.         Cognition and Memory: Memory is impaired.           CRANIAL NERVES     CN III, IV, VI   Pupils are equal, round, and reactive to light.       Significant Labs: All pertinent labs within the past 24 hours have been reviewed.    Significant Imaging: I have reviewed all pertinent imaging results/findings within the past 24 hours.    Assessment/Plan:     * Dementia  Continue home regimen of Aricept, Neurology consulted in ED, appreciate recs    Type 2 diabetes mellitus with hyperglycemia, without long-term current use of insulin  Check A1c  Hold oral antihyperglycemics while inpatient  PRN sliding scale insulin  ACHS glucose monitoring   ADA diet     Hypothyroidism  Check TSH    Essential  hypertension  Well controlled, continue home medications and monitor blood pressure, adjust as needed.       VTE Risk Mitigation (From admission, onward)         Ordered     enoxaparin injection 40 mg  Every 24 hours      07/02/20 1934     IP VTE HIGH RISK PATIENT  Once      07/02/20 1932     Place sequential compression device  Until discontinued      07/02/20 1932              Manny Cordero Jr., APRN, AGACN-BC  Hospitalist - Department of Hospital Medicine  [facility] [address] [place] [address]  Office #: [phone]; Pager #: [phone]

## 2020-07-03 NOTE — ASSESSMENT & PLAN NOTE
-Patient states she is diet controlled at home and not on medical treatment for this  -During her stay her sugars are elevated in the 250-300 range.  -Discussed with patient and  and advocated starting oral medications, but they prefer to discuss with their primary care provider at this time  -Continue diet control.  Likely to need medical treatment, but defer to primary care provider.  -Follow up in one week.

## 2020-07-03 NOTE — PLAN OF CARE
Problem: Fall Injury Risk  Goal: Absence of Fall and Fall-Related Injury  Outcome: Met     Problem: Adult Inpatient Plan of Care  Goal: Plan of Care Review  Outcome: Met  Goal: Patient-Specific Goal (Individualization)  Outcome: Met  Goal: Absence of Hospital-Acquired Illness or Injury  Outcome: Met  Goal: Optimal Comfort and Wellbeing  Outcome: Met  Goal: Readiness for Transition of Care  Outcome: Met  Goal: Rounds/Family Conference  Outcome: Met     Problem: Diabetes Comorbidity  Goal: Blood Glucose Level Within Desired Range  Outcome: Met

## 2020-07-03 NOTE — ED NOTES
Pt transported to MRI, report called to KARON Hamlin. Pt will be transferred to floor after MRI is completed.

## 2020-07-04 LAB — RPR SER QL: NORMAL

## 2021-01-25 ENCOUNTER — HOSPITAL ENCOUNTER (EMERGENCY)
Facility: HOSPITAL | Age: 86
Discharge: HOME | End: 2021-01-26
Attending: EMERGENCY MEDICINE
Payer: MEDICARE

## 2021-01-25 DIAGNOSIS — F03.90 DEMENTIA WITHOUT BEHAVIORAL DISTURBANCE, UNSPECIFIED DEMENTIA TYPE: Primary | ICD-10-CM

## 2021-01-25 DIAGNOSIS — R82.998 LEUKOCYTES IN URINE: ICD-10-CM

## 2021-01-25 DIAGNOSIS — R41.0 CONFUSION: ICD-10-CM

## 2021-01-25 DIAGNOSIS — R41.82 ALTERED MENTAL STATUS: ICD-10-CM

## 2021-01-25 LAB
ALBUMIN SERPL BCP-MCNC: 3 G/DL (ref 3.5–5.2)
ALP SERPL-CCNC: 120 U/L (ref 55–135)
ALT SERPL W/O P-5'-P-CCNC: 35 U/L (ref 10–44)
ANION GAP SERPL CALC-SCNC: 14 MMOL/L (ref 8–16)
AST SERPL-CCNC: 33 U/L (ref 10–40)
BASOPHILS # BLD AUTO: 0.06 K/UL (ref 0–0.2)
BASOPHILS NFR BLD: 0.7 % (ref 0–1.9)
BILIRUB SERPL-MCNC: 0.9 MG/DL (ref 0.1–1)
BNP SERPL-MCNC: 42 PG/ML (ref 0–99)
BUN SERPL-MCNC: 12 MG/DL (ref 8–23)
CALCIUM SERPL-MCNC: 8.9 MG/DL (ref 8.7–10.5)
CHLORIDE SERPL-SCNC: 100 MMOL/L (ref 95–110)
CO2 SERPL-SCNC: 25 MMOL/L (ref 23–29)
CREAT SERPL-MCNC: 0.9 MG/DL (ref 0.5–1.4)
CTP QC/QA: YES
DIFFERENTIAL METHOD: ABNORMAL
EOSINOPHIL # BLD AUTO: 0.6 K/UL (ref 0–0.5)
EOSINOPHIL NFR BLD: 7.4 % (ref 0–8)
ERYTHROCYTE [DISTWIDTH] IN BLOOD BY AUTOMATED COUNT: 12.5 % (ref 11.5–14.5)
EST. GFR  (AFRICAN AMERICAN): >60 ML/MIN/1.73 M^2
EST. GFR  (NON AFRICAN AMERICAN): 59 ML/MIN/1.73 M^2
GLUCOSE SERPL-MCNC: 125 MG/DL (ref 70–110)
HCT VFR BLD AUTO: 35.4 % (ref 37–48.5)
HGB BLD-MCNC: 11.6 G/DL (ref 12–16)
IMM GRANULOCYTES # BLD AUTO: 0.06 K/UL (ref 0–0.04)
IMM GRANULOCYTES NFR BLD AUTO: 0.7 % (ref 0–0.5)
LYMPHOCYTES # BLD AUTO: 1.5 K/UL (ref 1–4.8)
LYMPHOCYTES NFR BLD: 17.3 % (ref 18–48)
MCH RBC QN AUTO: 30.5 PG (ref 27–31)
MCHC RBC AUTO-ENTMCNC: 32.8 G/DL (ref 32–36)
MCV RBC AUTO: 93 FL (ref 82–98)
MONOCYTES # BLD AUTO: 0.8 K/UL (ref 0.3–1)
MONOCYTES NFR BLD: 8.8 % (ref 4–15)
NEUTROPHILS # BLD AUTO: 5.6 K/UL (ref 1.8–7.7)
NEUTROPHILS NFR BLD: 65.1 % (ref 38–73)
NRBC BLD-RTO: 0 /100 WBC
PLATELET # BLD AUTO: 383 K/UL (ref 150–350)
PMV BLD AUTO: 11 FL (ref 9.2–12.9)
POTASSIUM SERPL-SCNC: 4.2 MMOL/L (ref 3.5–5.1)
PROT SERPL-MCNC: 7 G/DL (ref 6–8.4)
RBC # BLD AUTO: 3.8 M/UL (ref 4–5.4)
SARS-COV-2 RDRP RESP QL NAA+PROBE: NEGATIVE
SODIUM SERPL-SCNC: 139 MMOL/L (ref 136–145)
TROPONIN I SERPL DL<=0.01 NG/ML-MCNC: 0.01 NG/ML (ref 0–0.03)
TSH SERPL DL<=0.005 MIU/L-ACNC: 1.72 UIU/ML (ref 0.4–4)
WBC # BLD AUTO: 8.55 K/UL (ref 3.9–12.7)

## 2021-01-25 PROCEDURE — 93005 ELECTROCARDIOGRAM TRACING: CPT

## 2021-01-25 PROCEDURE — 99285 EMERGENCY DEPT VISIT HI MDM: CPT | Mod: 25

## 2021-01-25 PROCEDURE — 83880 ASSAY OF NATRIURETIC PEPTIDE: CPT

## 2021-01-25 PROCEDURE — 93010 EKG 12-LEAD: ICD-10-PCS | Mod: ,,, | Performed by: INTERNAL MEDICINE

## 2021-01-25 PROCEDURE — 84484 ASSAY OF TROPONIN QUANT: CPT

## 2021-01-25 PROCEDURE — 93010 ELECTROCARDIOGRAM REPORT: CPT | Mod: ,,, | Performed by: INTERNAL MEDICINE

## 2021-01-25 PROCEDURE — 85025 COMPLETE CBC W/AUTO DIFF WBC: CPT

## 2021-01-25 PROCEDURE — U0002 COVID-19 LAB TEST NON-CDC: HCPCS | Performed by: EMERGENCY MEDICINE

## 2021-01-25 PROCEDURE — 80053 COMPREHEN METABOLIC PANEL: CPT

## 2021-01-25 PROCEDURE — 84443 ASSAY THYROID STIM HORMONE: CPT

## 2021-01-26 VITALS
TEMPERATURE: 98 F | HEIGHT: 64 IN | HEART RATE: 69 BPM | DIASTOLIC BLOOD PRESSURE: 62 MMHG | RESPIRATION RATE: 16 BRPM | OXYGEN SATURATION: 100 % | WEIGHT: 140 LBS | SYSTOLIC BLOOD PRESSURE: 130 MMHG | BODY MASS INDEX: 23.9 KG/M2

## 2021-01-26 LAB
BACTERIA #/AREA URNS HPF: NORMAL /HPF
BILIRUB UR QL STRIP: NEGATIVE
CAOX CRY URNS QL MICRO: NORMAL
CLARITY UR: ABNORMAL
COLOR UR: YELLOW
GLUCOSE UR QL STRIP: NEGATIVE
HGB UR QL STRIP: NEGATIVE
HYALINE CASTS #/AREA URNS LPF: 1 /LPF
KETONES UR QL STRIP: NEGATIVE
LEUKOCYTE ESTERASE UR QL STRIP: ABNORMAL
MICROSCOPIC COMMENT: NORMAL
NITRITE UR QL STRIP: NEGATIVE
PH UR STRIP: 6 [PH] (ref 5–8)
PROT UR QL STRIP: ABNORMAL
RBC #/AREA URNS HPF: 3 /HPF (ref 0–4)
SP GR UR STRIP: 1.02 (ref 1–1.03)
SQUAMOUS #/AREA URNS HPF: 2 /HPF
URN SPEC COLLECT METH UR: ABNORMAL
UROBILINOGEN UR STRIP-ACNC: ABNORMAL EU/DL
WBC #/AREA URNS HPF: 0 /HPF (ref 0–5)

## 2021-01-26 PROCEDURE — 81000 URINALYSIS NONAUTO W/SCOPE: CPT

## 2021-01-26 PROCEDURE — 25000003 PHARM REV CODE 250: Performed by: EMERGENCY MEDICINE

## 2021-01-26 RX ORDER — DONEPEZIL HYDROCHLORIDE 10 MG/1
10 TABLET, FILM COATED ORAL NIGHTLY
Qty: 30 TABLET | Refills: 3 | Status: ON HOLD | OUTPATIENT
Start: 2021-01-26 | End: 2021-02-27 | Stop reason: HOSPADM

## 2021-01-26 RX ORDER — CEPHALEXIN 500 MG/1
500 CAPSULE ORAL EVERY 8 HOURS
Qty: 30 CAPSULE | Refills: 0 | Status: SHIPPED | OUTPATIENT
Start: 2021-01-26 | End: 2021-02-05

## 2021-01-26 RX ORDER — OLANZAPINE 5 MG/1
2.5 TABLET, ORALLY DISINTEGRATING ORAL
Status: COMPLETED | OUTPATIENT
Start: 2021-01-26 | End: 2021-01-26

## 2021-01-26 RX ORDER — MEMANTINE HYDROCHLORIDE 5 MG/1
5 TABLET ORAL DAILY
Qty: 30 TABLET | Refills: 3 | Status: ON HOLD | OUTPATIENT
Start: 2021-01-26 | End: 2021-02-27 | Stop reason: HOSPADM

## 2021-01-26 RX ORDER — CEPHALEXIN 250 MG/1
500 CAPSULE ORAL
Status: COMPLETED | OUTPATIENT
Start: 2021-01-26 | End: 2021-01-26

## 2021-01-26 RX ADMIN — CEPHALEXIN 500 MG: 250 CAPSULE ORAL at 02:01

## 2021-01-26 RX ADMIN — OLANZAPINE 5 MG: 5 TABLET, ORALLY DISINTEGRATING ORAL at 02:01

## 2021-02-25 ENCOUNTER — HOSPITAL ENCOUNTER (INPATIENT)
Facility: HOSPITAL | Age: 86
LOS: 2 days | Discharge: HOSPICE | DRG: 200 | End: 2021-02-28
Attending: EMERGENCY MEDICINE | Admitting: INTERNAL MEDICINE
Payer: MEDICARE

## 2021-02-25 DIAGNOSIS — Z51.5 PALLIATIVE CARE ENCOUNTER: ICD-10-CM

## 2021-02-25 DIAGNOSIS — R41.82 AMS (ALTERED MENTAL STATUS): ICD-10-CM

## 2021-02-25 DIAGNOSIS — R93.89 ABNORMAL CT OF THE CHEST: ICD-10-CM

## 2021-02-25 DIAGNOSIS — R53.1 GENERALIZED WEAKNESS: ICD-10-CM

## 2021-02-25 DIAGNOSIS — J94.8 HYDROPNEUMOTHORAX: Primary | ICD-10-CM

## 2021-02-25 LAB
ALBUMIN SERPL BCP-MCNC: 2.6 G/DL (ref 3.5–5.2)
ALP SERPL-CCNC: 112 U/L (ref 55–135)
ALT SERPL W/O P-5'-P-CCNC: 8 U/L (ref 10–44)
ANION GAP SERPL CALC-SCNC: 13 MMOL/L (ref 8–16)
AST SERPL-CCNC: 16 U/L (ref 10–40)
BASOPHILS # BLD AUTO: 0.07 K/UL (ref 0–0.2)
BASOPHILS NFR BLD: 0.7 % (ref 0–1.9)
BILIRUB SERPL-MCNC: 0.8 MG/DL (ref 0.1–1)
BILIRUB UR QL STRIP: NEGATIVE
BUN SERPL-MCNC: 10 MG/DL (ref 8–23)
CALCIUM SERPL-MCNC: 8.4 MG/DL (ref 8.7–10.5)
CHLORIDE SERPL-SCNC: 98 MMOL/L (ref 95–110)
CLARITY UR: CLEAR
CO2 SERPL-SCNC: 27 MMOL/L (ref 23–29)
COLOR UR: YELLOW
CREAT SERPL-MCNC: 0.8 MG/DL (ref 0.5–1.4)
CTP QC/QA: YES
DIFFERENTIAL METHOD: ABNORMAL
EOSINOPHIL # BLD AUTO: 0.8 K/UL (ref 0–0.5)
EOSINOPHIL NFR BLD: 8.5 % (ref 0–8)
ERYTHROCYTE [DISTWIDTH] IN BLOOD BY AUTOMATED COUNT: 13.2 % (ref 11.5–14.5)
EST. GFR  (AFRICAN AMERICAN): >60 ML/MIN/1.73 M^2
EST. GFR  (NON AFRICAN AMERICAN): >60 ML/MIN/1.73 M^2
GLUCOSE SERPL-MCNC: 114 MG/DL (ref 70–110)
GLUCOSE UR QL STRIP: NEGATIVE
HCT VFR BLD AUTO: 36.7 % (ref 37–48.5)
HGB BLD-MCNC: 12.5 G/DL (ref 12–16)
HGB UR QL STRIP: NEGATIVE
IMM GRANULOCYTES # BLD AUTO: 0.05 K/UL (ref 0–0.04)
IMM GRANULOCYTES NFR BLD AUTO: 0.5 % (ref 0–0.5)
KETONES UR QL STRIP: NEGATIVE
LEUKOCYTE ESTERASE UR QL STRIP: NEGATIVE
LYMPHOCYTES # BLD AUTO: 1.8 K/UL (ref 1–4.8)
LYMPHOCYTES NFR BLD: 19.2 % (ref 18–48)
MCH RBC QN AUTO: 31.1 PG (ref 27–31)
MCHC RBC AUTO-ENTMCNC: 34.1 G/DL (ref 32–36)
MCV RBC AUTO: 91 FL (ref 82–98)
MONOCYTES # BLD AUTO: 0.8 K/UL (ref 0.3–1)
MONOCYTES NFR BLD: 8.2 % (ref 4–15)
NEUTROPHILS # BLD AUTO: 6 K/UL (ref 1.8–7.7)
NEUTROPHILS NFR BLD: 62.9 % (ref 38–73)
NITRITE UR QL STRIP: NEGATIVE
NRBC BLD-RTO: 0 /100 WBC
PH UR STRIP: 7 [PH] (ref 5–8)
PLATELET # BLD AUTO: 389 K/UL (ref 150–350)
PMV BLD AUTO: 10.7 FL (ref 9.2–12.9)
POCT GLUCOSE: 109 MG/DL (ref 70–110)
POTASSIUM SERPL-SCNC: 2.8 MMOL/L (ref 3.5–5.1)
PROT SERPL-MCNC: 6.5 G/DL (ref 6–8.4)
PROT UR QL STRIP: ABNORMAL
RBC # BLD AUTO: 4.02 M/UL (ref 4–5.4)
SARS-COV-2 RDRP RESP QL NAA+PROBE: NEGATIVE
SODIUM SERPL-SCNC: 138 MMOL/L (ref 136–145)
SP GR UR STRIP: 1.01 (ref 1–1.03)
TROPONIN I SERPL DL<=0.01 NG/ML-MCNC: 0.01 NG/ML (ref 0–0.03)
TSH SERPL DL<=0.005 MIU/L-ACNC: 2.87 UIU/ML (ref 0.4–4)
URN SPEC COLLECT METH UR: ABNORMAL
UROBILINOGEN UR STRIP-ACNC: NEGATIVE EU/DL
WBC # BLD AUTO: 9.53 K/UL (ref 3.9–12.7)

## 2021-02-25 PROCEDURE — 85025 COMPLETE CBC W/AUTO DIFF WBC: CPT

## 2021-02-25 PROCEDURE — 82962 GLUCOSE BLOOD TEST: CPT

## 2021-02-25 PROCEDURE — 81003 URINALYSIS AUTO W/O SCOPE: CPT

## 2021-02-25 PROCEDURE — 80053 COMPREHEN METABOLIC PANEL: CPT

## 2021-02-25 PROCEDURE — 93010 EKG 12-LEAD: ICD-10-PCS | Mod: ,,, | Performed by: INTERNAL MEDICINE

## 2021-02-25 PROCEDURE — 63600175 PHARM REV CODE 636 W HCPCS: Performed by: EMERGENCY MEDICINE

## 2021-02-25 PROCEDURE — 99285 EMERGENCY DEPT VISIT HI MDM: CPT | Mod: 25

## 2021-02-25 PROCEDURE — 93010 ELECTROCARDIOGRAM REPORT: CPT | Mod: ,,, | Performed by: INTERNAL MEDICINE

## 2021-02-25 PROCEDURE — 84443 ASSAY THYROID STIM HORMONE: CPT

## 2021-02-25 PROCEDURE — 84484 ASSAY OF TROPONIN QUANT: CPT

## 2021-02-25 PROCEDURE — 96375 TX/PRO/DX INJ NEW DRUG ADDON: CPT

## 2021-02-25 PROCEDURE — U0002 COVID-19 LAB TEST NON-CDC: HCPCS | Performed by: PHYSICIAN ASSISTANT

## 2021-02-25 PROCEDURE — 25000003 PHARM REV CODE 250: Performed by: STUDENT IN AN ORGANIZED HEALTH CARE EDUCATION/TRAINING PROGRAM

## 2021-02-25 PROCEDURE — 63600175 PHARM REV CODE 636 W HCPCS: Performed by: STUDENT IN AN ORGANIZED HEALTH CARE EDUCATION/TRAINING PROGRAM

## 2021-02-25 PROCEDURE — 96374 THER/PROPH/DIAG INJ IV PUSH: CPT

## 2021-02-25 PROCEDURE — 93005 ELECTROCARDIOGRAM TRACING: CPT

## 2021-02-25 PROCEDURE — 83690 ASSAY OF LIPASE: CPT

## 2021-02-25 PROCEDURE — 25500020 PHARM REV CODE 255: Performed by: EMERGENCY MEDICINE

## 2021-02-25 RX ORDER — DIPHENHYDRAMINE HYDROCHLORIDE 50 MG/ML
25 INJECTION INTRAMUSCULAR; INTRAVENOUS
Status: COMPLETED | OUTPATIENT
Start: 2021-02-25 | End: 2021-02-25

## 2021-02-25 RX ADMIN — IOHEXOL 50 ML: 350 INJECTION, SOLUTION INTRAVENOUS at 10:02

## 2021-02-25 RX ADMIN — METHYLPREDNISOLONE SODIUM SUCCINATE 40 MG: 40 INJECTION, POWDER, FOR SOLUTION INTRAMUSCULAR; INTRAVENOUS at 10:02

## 2021-02-25 RX ADMIN — POTASSIUM BICARBONATE 25 MEQ: 977.5 TABLET, EFFERVESCENT ORAL at 09:02

## 2021-02-25 RX ADMIN — DIPHENHYDRAMINE HYDROCHLORIDE 25 MG: 50 INJECTION INTRAMUSCULAR; INTRAVENOUS at 10:02

## 2021-02-26 PROBLEM — D49.9: Status: ACTIVE | Noted: 2021-02-26

## 2021-02-26 PROBLEM — R93.89 ABNORMAL CT OF THE CHEST: Status: ACTIVE | Noted: 2021-02-26

## 2021-02-26 PROBLEM — J94.8 HYDROPNEUMOTHORAX: Status: ACTIVE | Noted: 2021-02-26

## 2021-02-26 PROBLEM — Z51.5 PALLIATIVE CARE ENCOUNTER: Status: ACTIVE | Noted: 2021-02-26

## 2021-02-26 LAB
ALBUMIN SERPL BCP-MCNC: 2.5 G/DL (ref 3.5–5.2)
ALP SERPL-CCNC: 107 U/L (ref 55–135)
ALT SERPL W/O P-5'-P-CCNC: 9 U/L (ref 10–44)
ANION GAP SERPL CALC-SCNC: 15 MMOL/L (ref 8–16)
APTT BLDCRRT: 27.4 SEC (ref 21–32)
AST SERPL-CCNC: 17 U/L (ref 10–40)
BASOPHILS # BLD AUTO: 0.01 K/UL (ref 0–0.2)
BASOPHILS NFR BLD: 0.2 % (ref 0–1.9)
BILIRUB SERPL-MCNC: 0.8 MG/DL (ref 0.1–1)
BUN SERPL-MCNC: 8 MG/DL (ref 8–23)
CALCIUM SERPL-MCNC: 8.4 MG/DL (ref 8.7–10.5)
CHLORIDE SERPL-SCNC: 103 MMOL/L (ref 95–110)
CO2 SERPL-SCNC: 23 MMOL/L (ref 23–29)
CREAT SERPL-MCNC: 0.7 MG/DL (ref 0.5–1.4)
DIFFERENTIAL METHOD: ABNORMAL
EOSINOPHIL # BLD AUTO: 0 K/UL (ref 0–0.5)
EOSINOPHIL NFR BLD: 0.4 % (ref 0–8)
ERYTHROCYTE [DISTWIDTH] IN BLOOD BY AUTOMATED COUNT: 13 % (ref 11.5–14.5)
EST. GFR  (AFRICAN AMERICAN): >60 ML/MIN/1.73 M^2
EST. GFR  (NON AFRICAN AMERICAN): >60 ML/MIN/1.73 M^2
GLUCOSE SERPL-MCNC: 153 MG/DL (ref 70–110)
HCT VFR BLD AUTO: 36.4 % (ref 37–48.5)
HGB BLD-MCNC: 12.3 G/DL (ref 12–16)
IMM GRANULOCYTES # BLD AUTO: 0.02 K/UL (ref 0–0.04)
IMM GRANULOCYTES NFR BLD AUTO: 0.4 % (ref 0–0.5)
INR PPP: 1.1 (ref 0.8–1.2)
LIPASE SERPL-CCNC: 7 U/L (ref 4–60)
LIPASE SERPL-CCNC: 7 U/L (ref 4–60)
LYMPHOCYTES # BLD AUTO: 0.6 K/UL (ref 1–4.8)
LYMPHOCYTES NFR BLD: 11.4 % (ref 18–48)
MAGNESIUM SERPL-MCNC: 1.6 MG/DL (ref 1.6–2.6)
MCH RBC QN AUTO: 30.8 PG (ref 27–31)
MCHC RBC AUTO-ENTMCNC: 33.8 G/DL (ref 32–36)
MCV RBC AUTO: 91 FL (ref 82–98)
MONOCYTES # BLD AUTO: 0.1 K/UL (ref 0.3–1)
MONOCYTES NFR BLD: 1.3 % (ref 4–15)
NEUTROPHILS # BLD AUTO: 4.8 K/UL (ref 1.8–7.7)
NEUTROPHILS NFR BLD: 86.3 % (ref 38–73)
NRBC BLD-RTO: 0 /100 WBC
PLATELET # BLD AUTO: 311 K/UL (ref 150–350)
PMV BLD AUTO: 10.8 FL (ref 9.2–12.9)
POTASSIUM SERPL-SCNC: 3.4 MMOL/L (ref 3.5–5.1)
PROT SERPL-MCNC: 6.4 G/DL (ref 6–8.4)
PROTHROMBIN TIME: 11.1 SEC (ref 9–12.5)
RBC # BLD AUTO: 3.99 M/UL (ref 4–5.4)
SODIUM SERPL-SCNC: 141 MMOL/L (ref 136–145)
WBC # BLD AUTO: 5.6 K/UL (ref 3.9–12.7)

## 2021-02-26 PROCEDURE — 99223 1ST HOSP IP/OBS HIGH 75: CPT | Mod: ,,, | Performed by: NURSE PRACTITIONER

## 2021-02-26 PROCEDURE — 88305 TISSUE EXAM BY PATHOLOGIST: CPT | Performed by: PATHOLOGY

## 2021-02-26 PROCEDURE — 99223 PR INITIAL HOSPITAL CARE,LEVL III: ICD-10-PCS | Mod: ,,, | Performed by: NURSE PRACTITIONER

## 2021-02-26 PROCEDURE — 99497 PR ADVNCD CARE PLAN 30 MIN: ICD-10-PCS | Mod: 25,,, | Performed by: NURSE PRACTITIONER

## 2021-02-26 PROCEDURE — 88112 PR  CYTOPATH, CELL ENHANCE TECH: ICD-10-PCS | Mod: 26,,, | Performed by: PATHOLOGY

## 2021-02-26 PROCEDURE — 99205 PR OFFICE/OUTPT VISIT, NEW, LEVL V, 60-74 MIN: ICD-10-PCS | Mod: ,,, | Performed by: STUDENT IN AN ORGANIZED HEALTH CARE EDUCATION/TRAINING PROGRAM

## 2021-02-26 PROCEDURE — 83690 ASSAY OF LIPASE: CPT

## 2021-02-26 PROCEDURE — 96361 HYDRATE IV INFUSION ADD-ON: CPT

## 2021-02-26 PROCEDURE — 84157 ASSAY OF PROTEIN OTHER: CPT

## 2021-02-26 PROCEDURE — 88112 CYTOPATH CELL ENHANCE TECH: CPT | Mod: 26,,, | Performed by: PATHOLOGY

## 2021-02-26 PROCEDURE — 88112 CYTOPATH CELL ENHANCE TECH: CPT | Performed by: PATHOLOGY

## 2021-02-26 PROCEDURE — 88305 TISSUE EXAM BY PATHOLOGIST: CPT | Mod: 26,,, | Performed by: PATHOLOGY

## 2021-02-26 PROCEDURE — 83735 ASSAY OF MAGNESIUM: CPT

## 2021-02-26 PROCEDURE — 36415 COLL VENOUS BLD VENIPUNCTURE: CPT

## 2021-02-26 PROCEDURE — 85730 THROMBOPLASTIN TIME PARTIAL: CPT

## 2021-02-26 PROCEDURE — 85025 COMPLETE CBC W/AUTO DIFF WBC: CPT

## 2021-02-26 PROCEDURE — 99497 ADVNCD CARE PLAN 30 MIN: CPT | Mod: 25,,, | Performed by: NURSE PRACTITIONER

## 2021-02-26 PROCEDURE — 63600175 PHARM REV CODE 636 W HCPCS: Performed by: RADIOLOGY

## 2021-02-26 PROCEDURE — 80053 COMPREHEN METABOLIC PANEL: CPT

## 2021-02-26 PROCEDURE — 83615 LACTATE (LD) (LDH) ENZYME: CPT

## 2021-02-26 PROCEDURE — 99205 OFFICE O/P NEW HI 60 MIN: CPT | Mod: ,,, | Performed by: STUDENT IN AN ORGANIZED HEALTH CARE EDUCATION/TRAINING PROGRAM

## 2021-02-26 PROCEDURE — 88305 TISSUE EXAM BY PATHOLOGIST: ICD-10-PCS | Mod: 26,,, | Performed by: PATHOLOGY

## 2021-02-26 PROCEDURE — 63600175 PHARM REV CODE 636 W HCPCS: Performed by: HOSPITALIST

## 2021-02-26 PROCEDURE — 11000001 HC ACUTE MED/SURG PRIVATE ROOM

## 2021-02-26 PROCEDURE — 85610 PROTHROMBIN TIME: CPT

## 2021-02-26 PROCEDURE — 25000003 PHARM REV CODE 250: Performed by: HOSPITALIST

## 2021-02-26 RX ORDER — FAMOTIDINE 20 MG/1
20 TABLET, FILM COATED ORAL
Status: DISCONTINUED | OUTPATIENT
Start: 2021-02-26 | End: 2021-02-26

## 2021-02-26 RX ORDER — ACETAMINOPHEN 325 MG/1
650 TABLET ORAL EVERY 4 HOURS PRN
Status: DISCONTINUED | OUTPATIENT
Start: 2021-02-26 | End: 2021-02-28 | Stop reason: HOSPADM

## 2021-02-26 RX ORDER — SODIUM CHLORIDE 0.9 % (FLUSH) 0.9 %
10 SYRINGE (ML) INJECTION
Status: DISCONTINUED | OUTPATIENT
Start: 2021-02-26 | End: 2021-02-28 | Stop reason: HOSPADM

## 2021-02-26 RX ORDER — ONDANSETRON 2 MG/ML
4 INJECTION INTRAMUSCULAR; INTRAVENOUS EVERY 6 HOURS PRN
Status: DISCONTINUED | OUTPATIENT
Start: 2021-02-26 | End: 2021-02-28 | Stop reason: HOSPADM

## 2021-02-26 RX ORDER — MIDAZOLAM HYDROCHLORIDE 1 MG/ML
INJECTION INTRAMUSCULAR; INTRAVENOUS CODE/TRAUMA/SEDATION MEDICATION
Status: COMPLETED | OUTPATIENT
Start: 2021-02-26 | End: 2021-02-26

## 2021-02-26 RX ORDER — SODIUM CHLORIDE AND POTASSIUM CHLORIDE 150; 900 MG/100ML; MG/100ML
INJECTION, SOLUTION INTRAVENOUS CONTINUOUS
Status: DISCONTINUED | OUTPATIENT
Start: 2021-02-26 | End: 2021-02-28 | Stop reason: HOSPADM

## 2021-02-26 RX ORDER — TALC
6 POWDER (GRAM) TOPICAL NIGHTLY PRN
Status: DISCONTINUED | OUTPATIENT
Start: 2021-02-26 | End: 2021-02-28 | Stop reason: HOSPADM

## 2021-02-26 RX ORDER — MAG HYDROX/ALUMINUM HYD/SIMETH 200-200-20
30 SUSPENSION, ORAL (FINAL DOSE FORM) ORAL
Status: DISCONTINUED | OUTPATIENT
Start: 2021-02-26 | End: 2021-02-26

## 2021-02-26 RX ORDER — FENTANYL CITRATE 50 UG/ML
INJECTION, SOLUTION INTRAMUSCULAR; INTRAVENOUS CODE/TRAUMA/SEDATION MEDICATION
Status: COMPLETED | OUTPATIENT
Start: 2021-02-26 | End: 2021-02-26

## 2021-02-26 RX ORDER — HYDRALAZINE HYDROCHLORIDE 20 MG/ML
10 INJECTION INTRAMUSCULAR; INTRAVENOUS EVERY 8 HOURS PRN
Status: DISCONTINUED | OUTPATIENT
Start: 2021-02-26 | End: 2021-02-28 | Stop reason: HOSPADM

## 2021-02-26 RX ADMIN — FENTANYL CITRATE 25 MCG: 50 INJECTION INTRAMUSCULAR; INTRAVENOUS at 01:02

## 2021-02-26 RX ADMIN — ACETAMINOPHEN 650 MG: 325 TABLET ORAL at 04:02

## 2021-02-26 RX ADMIN — SODIUM CHLORIDE AND POTASSIUM CHLORIDE 75 ML/HR: 9; 1.49 INJECTION, SOLUTION INTRAVENOUS at 08:02

## 2021-02-26 RX ADMIN — FENTANYL CITRATE 25 MCG: 50 INJECTION INTRAMUSCULAR; INTRAVENOUS at 12:02

## 2021-02-26 RX ADMIN — MIDAZOLAM HYDROCHLORIDE 0.5 MG: 1 INJECTION, SOLUTION INTRAMUSCULAR; INTRAVENOUS at 01:02

## 2021-02-26 RX ADMIN — Medication 6 MG: at 10:02

## 2021-02-26 RX ADMIN — SODIUM CHLORIDE AND POTASSIUM CHLORIDE: 9; 1.49 INJECTION, SOLUTION INTRAVENOUS at 03:02

## 2021-02-26 RX ADMIN — MIDAZOLAM HYDROCHLORIDE 0.5 MG: 1 INJECTION, SOLUTION INTRAMUSCULAR; INTRAVENOUS at 12:02

## 2021-02-27 LAB
BODY FLUID SOURCE, LDH: NORMAL
LDH FLD L TO P-CCNC: 242 U/L
MAGNESIUM SERPL-MCNC: 1.5 MG/DL (ref 1.6–2.6)
POCT GLUCOSE: 121 MG/DL (ref 70–110)
PROT FLD-MCNC: 3.6 G/DL
SPECIMEN SOURCE: NORMAL

## 2021-02-27 PROCEDURE — 11000001 HC ACUTE MED/SURG PRIVATE ROOM

## 2021-02-27 PROCEDURE — 25000003 PHARM REV CODE 250: Performed by: HOSPITALIST

## 2021-02-27 PROCEDURE — 36415 COLL VENOUS BLD VENIPUNCTURE: CPT

## 2021-02-27 PROCEDURE — 63600175 PHARM REV CODE 636 W HCPCS: Performed by: HOSPITALIST

## 2021-02-27 PROCEDURE — 83735 ASSAY OF MAGNESIUM: CPT

## 2021-02-27 RX ADMIN — Medication 6 MG: at 11:02

## 2021-02-27 RX ADMIN — SODIUM CHLORIDE AND POTASSIUM CHLORIDE: 9; 1.49 INJECTION, SOLUTION INTRAVENOUS at 10:02

## 2021-02-28 VITALS
DIASTOLIC BLOOD PRESSURE: 84 MMHG | OXYGEN SATURATION: 93 % | RESPIRATION RATE: 18 BRPM | BODY MASS INDEX: 20.4 KG/M2 | TEMPERATURE: 98 F | SYSTOLIC BLOOD PRESSURE: 178 MMHG | WEIGHT: 119.5 LBS | HEART RATE: 73 BPM | HEIGHT: 64 IN

## 2021-02-28 LAB — MAGNESIUM SERPL-MCNC: 1.5 MG/DL (ref 1.6–2.6)

## 2021-02-28 PROCEDURE — 25000003 PHARM REV CODE 250: Performed by: PHYSICIAN ASSISTANT

## 2021-02-28 PROCEDURE — 36415 COLL VENOUS BLD VENIPUNCTURE: CPT

## 2021-02-28 PROCEDURE — 83735 ASSAY OF MAGNESIUM: CPT

## 2021-02-28 RX ORDER — HYDRALAZINE HYDROCHLORIDE 25 MG/1
25 TABLET, FILM COATED ORAL ONCE
Status: COMPLETED | OUTPATIENT
Start: 2021-02-28 | End: 2021-02-28

## 2021-02-28 RX ADMIN — HYDRALAZINE HYDROCHLORIDE 25 MG: 25 TABLET, FILM COATED ORAL at 12:02

## 2021-03-02 LAB — FINAL PATHOLOGIC DIAGNOSIS: NORMAL
